# Patient Record
Sex: FEMALE | Race: WHITE | NOT HISPANIC OR LATINO | URBAN - METROPOLITAN AREA
[De-identification: names, ages, dates, MRNs, and addresses within clinical notes are randomized per-mention and may not be internally consistent; named-entity substitution may affect disease eponyms.]

---

## 2024-09-09 ENCOUNTER — APPOINTMENT (OUTPATIENT)
Dept: NEUROSURGERY | Facility: CLINIC | Age: 34
End: 2024-09-09
Payer: MEDICARE

## 2024-09-09 VITALS
WEIGHT: 140 LBS | BODY MASS INDEX: 23.9 KG/M2 | RESPIRATION RATE: 20 BRPM | SYSTOLIC BLOOD PRESSURE: 120 MMHG | HEIGHT: 64 IN | DIASTOLIC BLOOD PRESSURE: 50 MMHG | HEART RATE: 82 BPM

## 2024-09-09 DIAGNOSIS — G93.5 CHIARI MALFORMATION TYPE I (MULTI): Primary | ICD-10-CM

## 2024-09-09 PROCEDURE — 1036F TOBACCO NON-USER: CPT | Performed by: NEUROLOGICAL SURGERY

## 2024-09-09 PROCEDURE — 99202 OFFICE O/P NEW SF 15 MIN: CPT | Performed by: NEUROLOGICAL SURGERY

## 2024-09-09 PROCEDURE — 3008F BODY MASS INDEX DOCD: CPT | Performed by: NEUROLOGICAL SURGERY

## 2024-09-09 RX ORDER — LEVONORGESTREL AND ETHINYL ESTRADIOL 0.15-0.03
KIT ORAL
COMMUNITY
Start: 2024-08-22

## 2024-09-09 RX ORDER — LEVOTHYROXINE SODIUM 50 UG/1
TABLET ORAL
COMMUNITY
Start: 2024-06-05

## 2024-09-09 RX ORDER — ONDANSETRON 4 MG/1
4 TABLET, ORALLY DISINTEGRATING ORAL EVERY 8 HOURS PRN
COMMUNITY
Start: 2024-04-15

## 2024-09-09 RX ORDER — MECLIZINE HYDROCHLORIDE 25 MG/1
25 TABLET ORAL EVERY 6 HOURS PRN
COMMUNITY
Start: 2024-04-13

## 2024-09-09 RX ORDER — BLOOD-GLUCOSE METER
EACH MISCELLANEOUS
COMMUNITY
Start: 2022-11-18

## 2024-09-09 RX ORDER — LANCETS 30 GAUGE
EACH MISCELLANEOUS
COMMUNITY
Start: 2024-07-07

## 2024-09-09 RX ORDER — QUETIAPINE FUMARATE 400 MG/1
800 TABLET, FILM COATED ORAL
COMMUNITY
Start: 2024-08-23 | End: 2024-09-22

## 2024-09-09 ASSESSMENT — ENCOUNTER SYMPTOMS
CONFUSION: 1
DIFFICULTY URINATING: 1
APPETITE CHANGE: 1
CARDIOVASCULAR NEGATIVE: 1
NUMBNESS: 1
NECK PAIN: 1
NAUSEA: 1
HEADACHES: 1
LIGHT-HEADEDNESS: 1
ACTIVITY CHANGE: 1
WEAKNESS: 1
ENDOCRINE NEGATIVE: 1
BRUISES/BLEEDS EASILY: 1
SEIZURES: 1
DIZZINESS: 1
VOMITING: 1
ALLERGIC/IMMUNOLOGIC NEGATIVE: 1
FATIGUE: 1
BACK PAIN: 1
RESPIRATORY NEGATIVE: 1
DIARRHEA: 1

## 2024-09-09 ASSESSMENT — PAIN SCALES - GENERAL: PAINLEVEL: 6

## 2024-09-09 NOTE — PROGRESS NOTES
"History of Chiari malformation and had 2 surgeries  last was at [facility]. Dr. Hernandez. Last surgery was in 2016. Had LP, MRI and CT at [facility], did not bring with her. Having headaches , numbness and tingling in her head and arms. Has throbbing head pain.     34-year-old woman presents for evaluation of headaches, vomiting, and Chiari malformation.  Her past medical history is significant for a Chiari decompression over 10 years ago and subsequent cranioplasty in 2016.  She reports that there was no improvement in her symptoms with either surgery.  Currently, she is complaining about severe daily headaches that are often associated with vomiting.  She feels that her coordination is slightly off.  She has not had any relief from medications.  Her past medical history is significant for multiple autoimmune disorders and fibromyalgia.    Review of Systems   Constitutional:  Positive for activity change, appetite change and fatigue.   HENT:  Positive for hearing loss.    Eyes:  Positive for visual disturbance.   Respiratory: Negative.     Cardiovascular: Negative.    Gastrointestinal:  Positive for diarrhea, nausea and vomiting.   Endocrine: Negative.    Genitourinary:  Positive for difficulty urinating.   Musculoskeletal:  Positive for back pain and neck pain.   Skin: Negative.    Allergic/Immunologic: Negative.    Neurological:  Positive for dizziness, seizures, weakness, light-headedness, numbness and headaches.   Hematological:  Bruises/bleeds easily.   Psychiatric/Behavioral:  Positive for confusion.        Visit Vitals  /50   Pulse 82   Resp 20   Ht 1.613 m (5' 3.5\")   Wt 63.5 kg (140 lb)   BMI 24.41 kg/m²   Smoking Status Former   BSA 1.69 m²           Current Outpatient Medications:     blood sugar diagnostic (OneTouch Verio test strips) strip, Test blood glucose tid or with symptoms of hypoglycemia, Disp: , Rfl:     levonorgestreL-ethinyl estrad (Seasonale) 0.15 mg-30 mcg (91) tablet, TAKE ONE TABLET BY MOUTH DAILY " to be taken  continuously, Disp: , Rfl:     levothyroxine (Synthroid, Levoxyl) 50 mcg tablet, TAKE 1 AND 1/2 TABLETS BY MOUTH EVERY MORNING 5 DAYS A WEEK AND 1 TABLET EVERY MORNING 2 DAYS A WEEK. TOTAL WEEKLY DOSE 9 AND 1/2 TABLETS., Disp: , Rfl:     meclizine (Antivert) 25 mg tablet, Take 1 tablet (25 mg) by mouth every 6 hours if needed., Disp: , Rfl:     ondansetron ODT (Zofran-ODT) 4 mg disintegrating tablet, Take 1 tablet (4 mg) by mouth every 8 hours if needed., Disp: , Rfl:     OneTouch Verio Reflect Meter misc, USE TO TEST BLOOD GLUCOSE THREE TIMES DAILY, Disp: , Rfl:     QUEtiapine (SEROquel) 400 mg tablet, Take 2 tablets (800 mg) by mouth., Disp: , Rfl:     Boswellia rafael extract (BOSWELLIA RAFAEL XT, BULK, MISC), 1 capsule., Disp: , Rfl:       Objective   Neurological Exam    On physical exam, the patient is alert and interactive.  She has a well-healed occipital incision.  Extraocular movements are intact.  Face moves symmetrically.  She moves all extremities symmetrically.  There is normal tone in her arms.  No Cristina signs present hands.    The patient has undergone MRI and CT imaging of her head but did not bring this with her for review.    I would like to see the patient's most recent imaging to evaluate the current state of the Chiari malformation before making any determination about whether or not there is any benefit to further surgical intervention.    ADDENDUM:   MRI shows the patient's suboccipital decompression and subsequent mesh cranioplasty.  The Chiari is well decompressed.  CINE flow study shows good flow of CSF between the head and spine.  At this time, I do not see any clear structural cause for her symptoms.  No surgical intervention is warranted.  I would be happy to see her again if any new symptoms arise.

## 2025-02-03 ENCOUNTER — TELEPHONE (OUTPATIENT)
Dept: OBSTETRICS AND GYNECOLOGY | Facility: CLINIC | Age: 35
End: 2025-02-03
Payer: MEDICARE

## 2025-02-24 ENCOUNTER — APPOINTMENT (OUTPATIENT)
Dept: OBSTETRICS AND GYNECOLOGY | Facility: CLINIC | Age: 35
End: 2025-02-24
Payer: MEDICARE

## 2025-02-24 VITALS
BODY MASS INDEX: 23.22 KG/M2 | DIASTOLIC BLOOD PRESSURE: 80 MMHG | HEIGHT: 64 IN | WEIGHT: 136 LBS | SYSTOLIC BLOOD PRESSURE: 134 MMHG

## 2025-02-24 DIAGNOSIS — E03.8 OTHER SPECIFIED HYPOTHYROIDISM: ICD-10-CM

## 2025-02-24 DIAGNOSIS — E20.812 AUTOIMMUNE HYPOPARATHYROIDISM (CMS-HCC): ICD-10-CM

## 2025-02-24 DIAGNOSIS — Z31.41 FERTILITY TESTING: Primary | ICD-10-CM

## 2025-02-24 DIAGNOSIS — Z32.02 PREGNANCY TEST NEGATIVE: ICD-10-CM

## 2025-02-24 LAB — PREGNANCY TEST URINE, POC: NEGATIVE

## 2025-02-24 ASSESSMENT — ENCOUNTER SYMPTOMS
GASTROINTESTINAL NEGATIVE: 1
RESPIRATORY NEGATIVE: 1
MUSCULOSKELETAL NEGATIVE: 1
CONSTITUTIONAL NEGATIVE: 1
CARDIOVASCULAR NEGATIVE: 1
PSYCHIATRIC NEGATIVE: 1
NEUROLOGICAL NEGATIVE: 1
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
## 2025-02-24 NOTE — PATIENT INSTRUCTIONS
Gyn Visit for Infertility:  You were seen in the office today for an infertility consultation  We discussed common causes of female infertility including: infrequent/irregular/absent ovulation, non gynecologic hormonal dysfunction (thyroid, adrenal gland, etc), structural abnormalities of the uterus (fibroids, polyps, septums, double uterus), adenomyosis or endometriosis, blocked tubes or other abnormalities of the tubes, and unexplained  We discussed common causes of male infertility including low sperm count, absence of sperm, abnormalities of sperm morphology, low sperm motility, and unexplained  We discussed fertility testing options including hormonal studies (thyroid function, prolactin, testosterone levels, estrogen levels, FSH, Anti Mullerian Hormone), pelvic imaging with ultrasound, tubal patency testing with hysterosalpingogram, and male factor testing with semen analysis.  We discussed treatment options including ovulation induction with oral or injectable medications and timed intercourse or intrauterine insemination, ovulation induction with timed intercourse or intrauterine insemination, In Viro Fertilization.  Your next step is to discuss whether you would like to proceed with further testing and treatment and let me know. Please call me with the first day of your next period.  Call the office with any questions. [phone] for [place] or [phone] for [place]

## 2025-03-04 PROBLEM — Z31.9 ENCOUNTER FOR INFERTILITY: Status: ACTIVE | Noted: 2025-03-04

## 2025-03-07 ENCOUNTER — TELEPHONE (OUTPATIENT)
Dept: OBSTETRICS AND GYNECOLOGY | Facility: CLINIC | Age: 35
End: 2025-03-07
Payer: MEDICARE

## 2025-03-08 LAB
25(OH)D3+25(OH)D2 SERPL-MCNC: 37 NG/ML (ref 30–100)
ESTRADIOL SERPL-MCNC: 95 PG/ML
FSH SERPL-ACNC: 10 MIU/ML
MIS SERPL-MCNC: 0.29 NG/ML (ref 0.36–10.07)
PROLACTIN SERPL-MCNC: 2.8 NG/ML
TESTOST FREE SERPL-MCNC: 1.3 PG/ML (ref 0.1–6.4)
TESTOST SERPL-MCNC: 33 NG/DL (ref 2–45)
TSH SERPL-ACNC: 1.64 MIU/L

## 2025-03-11 ENCOUNTER — HOSPITAL ENCOUNTER (OUTPATIENT)
Dept: RADIOLOGY | Facility: HOSPITAL | Age: 35
Discharge: HOME | End: 2025-03-11
Payer: MEDICARE

## 2025-03-11 DIAGNOSIS — Z31.41 FERTILITY TESTING: ICD-10-CM

## 2025-03-11 PROCEDURE — 76830 TRANSVAGINAL US NON-OB: CPT | Performed by: RADIOLOGY

## 2025-03-11 PROCEDURE — 76856 US EXAM PELVIC COMPLETE: CPT | Performed by: RADIOLOGY

## 2025-03-11 PROCEDURE — 76830 TRANSVAGINAL US NON-OB: CPT

## 2025-08-28 ENCOUNTER — APPOINTMENT (OUTPATIENT)
Dept: ENDOCRINOLOGY | Facility: CLINIC | Age: 35
End: 2025-08-28
Payer: MEDICARE